# Patient Record
Sex: MALE | Race: WHITE | Employment: FULL TIME | URBAN - NONMETROPOLITAN AREA
[De-identification: names, ages, dates, MRNs, and addresses within clinical notes are randomized per-mention and may not be internally consistent; named-entity substitution may affect disease eponyms.]

---

## 2021-05-26 ENCOUNTER — HOSPITAL ENCOUNTER (EMERGENCY)
Age: 26
Discharge: HOME | End: 2021-05-26
Attending: EMERGENCY MEDICINE
Payer: COMMERCIAL

## 2021-05-26 ENCOUNTER — APPOINTMENT (OUTPATIENT)
Dept: GENERAL RADIOLOGY | Age: 26
End: 2021-05-26
Payer: COMMERCIAL

## 2021-05-26 VITALS
WEIGHT: 285 LBS | HEART RATE: 83 BPM | DIASTOLIC BLOOD PRESSURE: 47 MMHG | OXYGEN SATURATION: 96 % | BODY MASS INDEX: 38.6 KG/M2 | TEMPERATURE: 97.6 F | RESPIRATION RATE: 19 BRPM | SYSTOLIC BLOOD PRESSURE: 151 MMHG | HEIGHT: 72 IN

## 2021-05-26 DIAGNOSIS — K29.01 ACUTE GASTRITIS WITH HEMORRHAGE, UNSPECIFIED GASTRITIS TYPE: Primary | ICD-10-CM

## 2021-05-26 LAB
ABSOLUTE EOS #: 0.18 K/UL (ref 0–0.44)
ABSOLUTE IMMATURE GRANULOCYTE: 0.04 K/UL (ref 0–0.3)
ABSOLUTE LYMPH #: 2.71 K/UL (ref 1.1–3.7)
ABSOLUTE MONO #: 0.81 K/UL (ref 0.1–1.2)
ALBUMIN SERPL-MCNC: 4.6 G/DL (ref 3.5–5.2)
ALBUMIN/GLOBULIN RATIO: 1.5 (ref 1–2.5)
ALP BLD-CCNC: 66 U/L (ref 40–129)
ALT SERPL-CCNC: 57 U/L (ref 5–41)
ANION GAP SERPL CALCULATED.3IONS-SCNC: 10 MMOL/L (ref 9–17)
AST SERPL-CCNC: 32 U/L
BASOPHILS # BLD: 0 % (ref 0–2)
BASOPHILS ABSOLUTE: 0.04 K/UL (ref 0–0.2)
BILIRUB SERPL-MCNC: 0.26 MG/DL (ref 0.3–1.2)
BUN BLDV-MCNC: 22 MG/DL (ref 6–20)
BUN/CREAT BLD: 28 (ref 9–20)
CALCIUM SERPL-MCNC: 10.1 MG/DL (ref 8.6–10.4)
CHLORIDE BLD-SCNC: 97 MMOL/L (ref 98–107)
CO2: 26 MMOL/L (ref 20–31)
CREAT SERPL-MCNC: 0.78 MG/DL (ref 0.7–1.2)
DIFFERENTIAL TYPE: ABNORMAL
EOSINOPHILS RELATIVE PERCENT: 2 % (ref 1–4)
GFR AFRICAN AMERICAN: >60 ML/MIN
GFR NON-AFRICAN AMERICAN: >60 ML/MIN
GFR SERPL CREATININE-BSD FRML MDRD: ABNORMAL ML/MIN/{1.73_M2}
GFR SERPL CREATININE-BSD FRML MDRD: ABNORMAL ML/MIN/{1.73_M2}
GLUCOSE BLD-MCNC: 98 MG/DL (ref 70–99)
HCT VFR BLD CALC: 52.4 % (ref 40.7–50.3)
HEMOGLOBIN: 18.4 G/DL (ref 13–17)
IMMATURE GRANULOCYTES: 0 %
LIPASE: 42 U/L (ref 13–60)
LYMPHOCYTES # BLD: 27 % (ref 24–43)
MAGNESIUM: 2 MG/DL (ref 1.6–2.6)
MCH RBC QN AUTO: 29.9 PG (ref 25.2–33.5)
MCHC RBC AUTO-ENTMCNC: 35.1 G/DL (ref 28.4–34.8)
MCV RBC AUTO: 85.2 FL (ref 82.6–102.9)
MONOCYTES # BLD: 8 % (ref 3–12)
NRBC AUTOMATED: 0 PER 100 WBC
PDW BLD-RTO: 11.8 % (ref 11.8–14.4)
PLATELET # BLD: 250 K/UL (ref 138–453)
PLATELET ESTIMATE: ABNORMAL
PMV BLD AUTO: 9.2 FL (ref 8.1–13.5)
POTASSIUM SERPL-SCNC: 3.5 MMOL/L (ref 3.7–5.3)
RBC # BLD: 6.15 M/UL (ref 4.21–5.77)
RBC # BLD: ABNORMAL 10*6/UL
SEG NEUTROPHILS: 63 % (ref 36–65)
SEGMENTED NEUTROPHILS ABSOLUTE COUNT: 6.31 K/UL (ref 1.5–8.1)
SODIUM BLD-SCNC: 133 MMOL/L (ref 135–144)
TOTAL PROTEIN: 7.7 G/DL (ref 6.4–8.3)
WBC # BLD: 10.1 K/UL (ref 3.5–11.3)
WBC # BLD: ABNORMAL 10*3/UL

## 2021-05-26 PROCEDURE — 83690 ASSAY OF LIPASE: CPT

## 2021-05-26 PROCEDURE — 2580000003 HC RX 258: Performed by: EMERGENCY MEDICINE

## 2021-05-26 PROCEDURE — 83735 ASSAY OF MAGNESIUM: CPT

## 2021-05-26 PROCEDURE — 85025 COMPLETE CBC W/AUTO DIFF WBC: CPT

## 2021-05-26 PROCEDURE — 96365 THER/PROPH/DIAG IV INF INIT: CPT

## 2021-05-26 PROCEDURE — 36415 COLL VENOUS BLD VENIPUNCTURE: CPT

## 2021-05-26 PROCEDURE — 6360000002 HC RX W HCPCS: Performed by: EMERGENCY MEDICINE

## 2021-05-26 PROCEDURE — 71046 X-RAY EXAM CHEST 2 VIEWS: CPT

## 2021-05-26 PROCEDURE — 80053 COMPREHEN METABOLIC PANEL: CPT

## 2021-05-26 PROCEDURE — 6370000000 HC RX 637 (ALT 250 FOR IP): Performed by: EMERGENCY MEDICINE

## 2021-05-26 PROCEDURE — C9113 INJ PANTOPRAZOLE SODIUM, VIA: HCPCS | Performed by: EMERGENCY MEDICINE

## 2021-05-26 PROCEDURE — 99285 EMERGENCY DEPT VISIT HI MDM: CPT

## 2021-05-26 RX ORDER — ARIPIPRAZOLE 2 MG/1
2 TABLET ORAL DAILY
COMMUNITY

## 2021-05-26 RX ORDER — SUCRALFATE 1 G/1
1 TABLET ORAL 4 TIMES DAILY
Qty: 120 TABLET | Refills: 3 | Status: SHIPPED | OUTPATIENT
Start: 2021-05-26

## 2021-05-26 RX ORDER — LISINOPRIL 20 MG/1
20 TABLET ORAL DAILY
COMMUNITY

## 2021-05-26 RX ORDER — ONDANSETRON 4 MG/1
4 TABLET, ORALLY DISINTEGRATING ORAL ONCE
Status: DISCONTINUED | OUTPATIENT
Start: 2021-05-26 | End: 2021-05-27 | Stop reason: HOSPADM

## 2021-05-26 RX ORDER — SUCRALFATE 1 G/1
1 TABLET ORAL ONCE
Status: DISCONTINUED | OUTPATIENT
Start: 2021-05-26 | End: 2021-05-27 | Stop reason: HOSPADM

## 2021-05-26 RX ORDER — ONDANSETRON 4 MG/1
4 TABLET, ORALLY DISINTEGRATING ORAL EVERY 8 HOURS PRN
Qty: 20 TABLET | Refills: 0 | Status: SHIPPED | OUTPATIENT
Start: 2021-05-26

## 2021-05-26 RX ORDER — LAMOTRIGINE 25 MG/1
50 TABLET ORAL DAILY
COMMUNITY

## 2021-05-26 RX ORDER — ONDANSETRON 4 MG/1
4 TABLET, ORALLY DISINTEGRATING ORAL ONCE
Status: COMPLETED | OUTPATIENT
Start: 2021-05-26 | End: 2021-05-26

## 2021-05-26 RX ORDER — HYDROCHLOROTHIAZIDE 12.5 MG/1
12.5 TABLET ORAL DAILY
COMMUNITY

## 2021-05-26 RX ORDER — PANTOPRAZOLE SODIUM 40 MG/1
40 TABLET, DELAYED RELEASE ORAL DAILY
Qty: 30 TABLET | Refills: 3 | Status: SHIPPED | OUTPATIENT
Start: 2021-05-26

## 2021-05-26 RX ORDER — 0.9 % SODIUM CHLORIDE 0.9 %
1000 INTRAVENOUS SOLUTION INTRAVENOUS ONCE
Status: COMPLETED | OUTPATIENT
Start: 2021-05-26 | End: 2021-05-26

## 2021-05-26 RX ORDER — FAMOTIDINE 20 MG/1
20 TABLET, FILM COATED ORAL 2 TIMES DAILY
COMMUNITY

## 2021-05-26 RX ORDER — PANTOPRAZOLE SODIUM 40 MG/10ML
INJECTION, POWDER, LYOPHILIZED, FOR SOLUTION INTRAVENOUS
Status: DISCONTINUED
Start: 2021-05-26 | End: 2021-05-27 | Stop reason: HOSPADM

## 2021-05-26 RX ADMIN — SODIUM CHLORIDE 1000 ML: 9 INJECTION, SOLUTION INTRAVENOUS at 21:02

## 2021-05-26 RX ADMIN — PANTOPRAZOLE SODIUM 80 MG: 40 INJECTION, POWDER, FOR SOLUTION INTRAVENOUS at 21:03

## 2021-05-26 RX ADMIN — ONDANSETRON 4 MG: 4 TABLET, ORALLY DISINTEGRATING ORAL at 21:03

## 2021-05-26 ASSESSMENT — PAIN SCALES - GENERAL: PAINLEVEL_OUTOF10: 4

## 2021-05-26 ASSESSMENT — PAIN DESCRIPTION - PAIN TYPE: TYPE: ACUTE PAIN

## 2021-05-26 ASSESSMENT — PAIN DESCRIPTION - DESCRIPTORS: DESCRIPTORS: DISCOMFORT

## 2021-05-26 ASSESSMENT — PAIN DESCRIPTION - LOCATION: LOCATION: ABDOMEN

## 2021-05-27 NOTE — ED PROVIDER NOTES
[facility]  EMERGENCY DEPARTMENT ENCOUNTER      Pt Name: Pete Wang  MRN: [id number]  [place] 1995  Date of evaluation: 5/26/2021  Provider: Sera Barbosa MD    [address] [place] [address]       Chief Complaint   Patient presents with    Emesis     bloody emesis x 3 starting just prior to arrival         HISTORY OF PRESENT ILLNESS   (Location/Symptom, Timing/Onset, Context/Setting, Quality, Duration, Modifying Factors, Severity)  Note limiting factors. Pete Wang is a 31 y.o. male who presents to the emergency department      58-year-old male brought to the emergency department for evaluation of vomiting blood. Patient states that he had had some chicken salad this evening. About a half an hour after that he developed nausea and had an episode of bright red blood. He has had 2 emesis since then both of which have dark streaks. States a few years ago he had a similar episode though he vomited a significant amount of blood he became dizzy lightheaded and was noted to be anemic. 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 He did not require any surgery other than the endoscopy at that time. States he had been placed on Protonix but has been weaned off this. Currently only takes Pepcid usually in the morning and then in the evening if needed. Denies any significant alcohol use and has not drank any alcohol recently. Does have intermittent symptoms of heartburn but none today. No known sick contacts. No diarrhea. No constipation. Patient has some left upper quadrant tenderness but otherwise denies any chest pain any other abdominal pain or distention or any other acute concerns. Nursing Notes were reviewed. REVIEW OF SYSTEMS    (2-9 systems for level 4, 10 or more for level 5)     Review of Systems   All other systems reviewed and are negative.       Except as noted above the remainder of the review of systems was and Family:     Frequency of Social Gatherings with Friends and Family:     Attends [employer] Services:     Active Member of Clubs or Organizations:     Attends Club or Organization Meetings:     Marital Status:    Intimate Partner Violence:     Fear of Current or Ex-Partner:     Emotionally Abused:     Physically Abused:     Sexually Abused:        SCREENINGS        [place] Coma Scale  Eye Opening: Spontaneous  Best Verbal Response: Oriented  Best Motor Response: Obeys commands  Connie Coma Scale Score: 15               PHYSICAL EXAM    (up to 7 for level 4, 8 or more for level 5)     ED Triage Vitals [05/26/21 2015]   BP Temp Temp Source Pulse Resp SpO2 Height Weight   134/80 97.6 °F (36.4 °C) Temporal 113 18 95 % 6' (1.829 m) 285 lb (129.3 kg)       Physical Exam  Vitals and nursing note reviewed. Constitutional:       General: He is not in acute distress. Appearance: He is not toxic-appearing. HENT:      Head: Normocephalic and atraumatic. Cardiovascular:      Rate and Rhythm: Normal rate and regular rhythm. Pulmonary:      Effort: Pulmonary effort is normal.      Breath sounds: Normal breath sounds. Abdominal:      Comments: Abdomen is nondistended. Patient does have left upper quadrant tenderness on exam.  There is no rebound tenderness. No guarding. Musculoskeletal:         General: Normal range of motion. Skin:     General: Skin is warm and dry. Findings: No rash. Neurological:      General: No focal deficit present. Mental Status: He is alert and oriented to person, place, and time.          DIAGNOSTIC RESULTS     EKG: All EKG's are interpreted by the Emergency Department Physician who either signs or Co-signs this chart in the absence of a cardiologist.        RADIOLOGY:   Non-plain film images such as CT, Ultrasound and MRI are read by the radiologist. Plain radiographic images are visualized and preliminarily interpreted by the emergency physician with the below findings:        Interpretation per the Radiologist below, if available at the time of this note:    XR CHEST (2 VW)   Final Result   No acute cardiopulmonary disease. ED BEDSIDE ULTRASOUND:   Performed by ED Physician - none    LABS:  Labs Reviewed   CBC WITH AUTO DIFFERENTIAL - Abnormal; Notable for the following components:       Result Value    RBC 6.15 (*)     Hemoglobin 18.4 (*)     Hematocrit 52.4 (*)     MCHC 35.1 (*)     All other components within normal limits   COMPREHENSIVE METABOLIC PANEL W/ REFLEX TO MG FOR LOW K - Abnormal; Notable for the following components:    BUN 22 (*)     Bun/Cre Ratio 28 (*)     Sodium 133 (*)     Potassium 3.5 (*)     Chloride 97 (*)     ALT 57 (*)     Total Bilirubin 0.26 (*)     All other components within normal limits   LIPASE   MAGNESIUM       All other labs were within normal range or not returned as of this dictation. EMERGENCY DEPARTMENT COURSE and DIFFERENTIAL DIAGNOSIS/MDM:   Vitals:    Vitals:    05/26/21 2201 05/26/21 2215 05/26/21 2216 05/26/21 2230   BP:  (!) 140/59  (!) 151/47   Pulse:  90  83   Resp:  22  19   Temp:       TempSrc:       SpO2: 95%  96% 96%   Weight:       Height:               MDM  Number of Diagnoses or Management Options  Acute gastritis with hemorrhage, unspecified gastritis type  Diagnosis management comments: IV fluids Protonix and Zofran ordered. Patient states he is feeling significantly better. Heart rate and blood pressure remained stable. Chest x-ray unremarkable. Globin elevated at 18.4. Patient was discussed via telephone with Dr. Landen Muller. Per discussion patient is feeling significantly better and he does wish to try outpatient management. He remained hemodynamically stable had no recurrent episodes of emesis following Zofran administration. After discussion he will be discharged home will be started on Protonix daily he was also given a prescription for Zofran as well as Carafate.   Per discussion with  Shahrzad De La Paz he will follow-up in her office on Friday. Patient was instructed to make sure that he follows up with general surgery on Friday but that he seeks medical attention immediately for any recurrent symptoms worsening pain or any other acute concerns. He was agreeable to this and stable for discharge home. MIPS       REASSESSMENT          CRITICAL CARE TIME   Total Critical Care time was  minutes, excluding separately reportable procedures. There was a high probability of clinically significant/life threatening deterioration in the patient's condition which required my urgent intervention. CONSULTS:  None    PROCEDURES:  Unless otherwise noted below, none     Procedures      FINAL IMPRESSION      1. Acute gastritis with hemorrhage, unspecified gastritis type          DISPOSITION/PLAN   DISPOSITION Decision To Discharge 05/26/2021 10:24:12 PM      PATIENT REFERRED TO:  [address], [address] [place] [address]       Call tomorrow to be seen Friday`      DISCHARGE MEDICATIONS:  Discharge Medication List as of 5/26/2021 10:29 PM      START taking these medications    Details   ondansetron (ZOFRAN ODT) 4 MG disintegrating tablet Take 1 tablet by mouth every 8 hours as needed for Nausea or Vomiting, Disp-20 tablet, R-0Print      pantoprazole (PROTONIX) 40 MG tablet Take 1 tablet by mouth daily, Disp-30 tablet, R-3Print      sucralfate (CARAFATE) 1 GM tablet Take 1 tablet by mouth 4 times daily, Disp-120 tablet, R-3Print           Controlled Substances Monitoring:     No flowsheet data found.     (Please note that portions of this note were completed with a voice recognition program.  Efforts were made to edit the dictations but occasionally words are mis-transcribed.)    Mena Cervantes MD (electronically signed)  Attending Emergency Physician             Mena Cervantes MD  05/27/21 1217

## 2022-03-14 ENCOUNTER — HOSPITAL ENCOUNTER (EMERGENCY)
Age: 27
Discharge: HOME | End: 2022-03-14
Attending: EMERGENCY MEDICINE
Payer: COMMERCIAL

## 2022-03-14 ENCOUNTER — APPOINTMENT (OUTPATIENT)
Dept: CT IMAGING | Age: 27
End: 2022-03-14
Payer: COMMERCIAL

## 2022-03-14 ENCOUNTER — APPOINTMENT (OUTPATIENT)
Dept: GENERAL RADIOLOGY | Age: 27
End: 2022-03-14
Payer: COMMERCIAL

## 2022-03-14 VITALS
RESPIRATION RATE: 18 BRPM | DIASTOLIC BLOOD PRESSURE: 66 MMHG | HEART RATE: 104 BPM | OXYGEN SATURATION: 99 % | SYSTOLIC BLOOD PRESSURE: 151 MMHG | TEMPERATURE: 98 F

## 2022-03-14 DIAGNOSIS — M94.0 COSTOCHONDRITIS: Primary | ICD-10-CM

## 2022-03-14 DIAGNOSIS — R10.12 ABDOMINAL PAIN, LEFT UPPER QUADRANT: ICD-10-CM

## 2022-03-14 PROCEDURE — 96372 THER/PROPH/DIAG INJ SC/IM: CPT

## 2022-03-14 PROCEDURE — 99283 EMERGENCY DEPT VISIT LOW MDM: CPT

## 2022-03-14 PROCEDURE — 6370000000 HC RX 637 (ALT 250 FOR IP): Performed by: EMERGENCY MEDICINE

## 2022-03-14 PROCEDURE — 6360000002 HC RX W HCPCS: Performed by: EMERGENCY MEDICINE

## 2022-03-14 PROCEDURE — 71101 X-RAY EXAM UNILAT RIBS/CHEST: CPT

## 2022-03-14 PROCEDURE — 74160 CT ABDOMEN W/CONTRAST: CPT

## 2022-03-14 PROCEDURE — 6360000004 HC RX CONTRAST MEDICATION: Performed by: EMERGENCY MEDICINE

## 2022-03-14 RX ORDER — HYDROCODONE BITARTRATE AND ACETAMINOPHEN 5; 325 MG/1; MG/1
1 TABLET ORAL ONCE
Status: COMPLETED | OUTPATIENT
Start: 2022-03-14 | End: 2022-03-14

## 2022-03-14 RX ORDER — KETOROLAC TROMETHAMINE 30 MG/ML
60 INJECTION, SOLUTION INTRAMUSCULAR; INTRAVENOUS ONCE
Status: COMPLETED | OUTPATIENT
Start: 2022-03-14 | End: 2022-03-14

## 2022-03-14 RX ADMIN — HYDROCODONE BITARTRATE AND ACETAMINOPHEN 1 TABLET: 5; 325 TABLET ORAL at 19:43

## 2022-03-14 RX ADMIN — KETOROLAC TROMETHAMINE 60 MG: 30 INJECTION, SOLUTION INTRAMUSCULAR at 21:14

## 2022-03-14 RX ADMIN — IOPAMIDOL 75 ML: 755 INJECTION, SOLUTION INTRAVENOUS at 19:54

## 2022-03-14 ASSESSMENT — PAIN SCALES - GENERAL
PAINLEVEL_OUTOF10: 7

## 2022-03-14 ASSESSMENT — ENCOUNTER SYMPTOMS
ABDOMINAL PAIN: 1
NAUSEA: 0
SHORTNESS OF BREATH: 0
SORE THROAT: 0
TROUBLE SWALLOWING: 0
VOMITING: 0

## 2022-03-14 ASSESSMENT — PAIN DESCRIPTION - DESCRIPTORS: DESCRIPTORS: SHARP

## 2022-03-14 ASSESSMENT — PAIN DESCRIPTION - ORIENTATION: ORIENTATION: LEFT

## 2022-03-14 ASSESSMENT — PAIN DESCRIPTION - PAIN TYPE: TYPE: ACUTE PAIN

## 2022-03-14 ASSESSMENT — PAIN DESCRIPTION - LOCATION: LOCATION: ABDOMEN

## 2022-03-14 NOTE — LETTER
[facility] ED  [address] [place] [address] [place] [address]  Phone: [phone]  Fax: [phone]               March 14, 2022    Patient: Gissel Jerome   YOB: 1995   Date of Visit: 3/14/2022       To Whom It May Concern:    Gissel Jerome was seen and treated in our emergency department on 3/14/2022. He should be on light duty until 3/20/2022.       Sincerely,       Dolly Glover RN         Signature:__________________________________

## 2024-12-09 ENCOUNTER — TRANSCRIBE ORDERS (OUTPATIENT)
Dept: ADMINISTRATIVE | Age: 29
End: 2024-12-09

## 2024-12-09 DIAGNOSIS — Z02.1 PRE-EMPLOYMENT HEALTH SCREENING EXAMINATION: Primary | ICD-10-CM

## 2024-12-11 ENCOUNTER — HOSPITAL ENCOUNTER (OUTPATIENT)
Age: 29
Discharge: HOME | End: 2024-12-11

## 2024-12-11 ENCOUNTER — HOSPITAL ENCOUNTER (OUTPATIENT)
Age: 29
Discharge: HOME | End: 2024-12-13

## 2024-12-11 DIAGNOSIS — Z02.1 PRE-EMPLOYMENT HEALTH SCREENING EXAMINATION: ICD-10-CM

## 2024-12-11 LAB
ALBUMIN SERPL-MCNC: 4.6 G/DL (ref 3.5–5.2)
ALBUMIN/GLOB SERPL: 1.7 {RATIO} (ref 1–2.5)
ALP SERPL-CCNC: 64 U/L (ref 40–129)
ALT SERPL-CCNC: 39 U/L (ref 10–50)
ANION GAP SERPL CALCULATED.3IONS-SCNC: 22 MMOL/L (ref 9–16)
AST SERPL-CCNC: 34 U/L (ref 10–50)
BILIRUB SERPL-MCNC: 0.5 MG/DL (ref 0–1.2)
BUN SERPL-MCNC: 9 MG/DL (ref 6–20)
BUN/CREAT SERPL: 8 (ref 9–20)
CALCIUM SERPL-MCNC: 9.6 MG/DL (ref 8.6–10.4)
CHLORIDE SERPL-SCNC: 101 MMOL/L (ref 98–107)
CHOLEST SERPL-MCNC: 128 MG/DL (ref 0–199)
CHOLESTEROL/HDL RATIO: 3.5
CO2 SERPL-SCNC: 16 MMOL/L (ref 20–31)
CREAT SERPL-MCNC: 1.2 MG/DL (ref 0.7–1.2)
ERYTHROCYTE [DISTWIDTH] IN BLOOD BY AUTOMATED COUNT: 12.2 % (ref 11.8–14.4)
GFR, ESTIMATED: 82 ML/MIN/1.73M2
GLUCOSE SERPL-MCNC: 95 MG/DL (ref 74–99)
HCT VFR BLD AUTO: 59 % (ref 40.7–50.3)
HDLC SERPL-MCNC: 37 MG/DL
HGB BLD-MCNC: 20.3 G/DL (ref 13–17)
LDLC SERPL CALC-MCNC: 75 MG/DL (ref 0–100)
MCH RBC QN AUTO: 30.5 PG (ref 25.2–33.5)
MCHC RBC AUTO-ENTMCNC: 34.4 G/DL (ref 28.4–34.8)
MCV RBC AUTO: 88.6 FL (ref 82.6–102.9)
NRBC BLD-RTO: 0 PER 100 WBC
PLATELET # BLD AUTO: 264 K/UL (ref 138–453)
PMV BLD AUTO: 9.6 FL (ref 8.1–13.5)
POTASSIUM SERPL-SCNC: 4.2 MMOL/L (ref 3.7–5.3)
PROT SERPL-MCNC: 7.3 G/DL (ref 6.6–8.7)
PSA SERPL-MCNC: 0.6 NG/ML (ref 0–4)
RBC # BLD AUTO: 6.66 M/UL (ref 4.21–5.77)
SODIUM SERPL-SCNC: 139 MMOL/L (ref 136–145)
STRESS BASELINE DIAS BP: 70 MMHG
STRESS BASELINE HR: 85 BPM
STRESS BASELINE SYS BP: 122 MMHG
STRESS ESTIMATED WORKLOAD: 14.1 METS
STRESS EXERCISE DUR MIN: 12 MIN
STRESS EXERCISE DUR SEC: 23 SEC
STRESS PEAK DIAS BP: 88 MMHG
STRESS PEAK SYS BP: 172 MMHG
STRESS PERCENT HR ACHIEVED: 98 %
STRESS POST PEAK HR: 187 BPM
STRESS RATE PRESSURE PRODUCT: NORMAL BPM*MMHG
STRESS ST DEPRESSION: 0.8 MM
STRESS TARGET HR: 191 BPM
TRIGL SERPL-MCNC: 78 MG/DL
VLDLC SERPL CALC-MCNC: 16 MG/DL (ref 1–30)
WBC OTHER # BLD: 8.4 K/UL (ref 3.5–11.3)

## 2024-12-11 PROCEDURE — 36415 COLL VENOUS BLD VENIPUNCTURE: CPT

## 2024-12-11 PROCEDURE — 93018 CV STRESS TEST I&R ONLY: CPT | Performed by: FAMILY MEDICINE

## 2024-12-11 PROCEDURE — 80053 COMPREHEN METABOLIC PANEL: CPT

## 2024-12-11 PROCEDURE — 85027 COMPLETE CBC AUTOMATED: CPT

## 2024-12-11 PROCEDURE — 93005 ELECTROCARDIOGRAM TRACING: CPT | Performed by: NURSE PRACTITIONER

## 2024-12-11 PROCEDURE — G0103 PSA SCREENING: HCPCS

## 2024-12-11 PROCEDURE — 93017 CV STRESS TEST TRACING ONLY: CPT

## 2024-12-11 PROCEDURE — 80061 LIPID PANEL: CPT

## 2024-12-11 PROCEDURE — 93016 CV STRESS TEST SUPVJ ONLY: CPT | Performed by: FAMILY MEDICINE

## 2024-12-11 PROCEDURE — 86480 TB TEST CELL IMMUN MEASURE: CPT

## 2024-12-12 LAB
EKG ATRIAL RATE: 72 BPM
EKG P AXIS: 34 DEGREES
EKG P-R INTERVAL: 122 MS
EKG Q-T INTERVAL: 384 MS
EKG QRS DURATION: 106 MS
EKG QTC CALCULATION (BAZETT): 420 MS
EKG R AXIS: 77 DEGREES
EKG T AXIS: 11 DEGREES
EKG VENTRICULAR RATE: 72 BPM

## 2024-12-12 PROCEDURE — 93010 ELECTROCARDIOGRAM REPORT: CPT | Performed by: INTERNAL MEDICINE

## 2024-12-13 ENCOUNTER — HOSPITAL ENCOUNTER (OUTPATIENT)
Dept: GENERAL RADIOLOGY | Age: 29
Discharge: HOME | End: 2024-12-15

## 2024-12-13 ENCOUNTER — HOSPITAL ENCOUNTER (OUTPATIENT)
Age: 29
Discharge: HOME | End: 2024-12-15

## 2024-12-13 DIAGNOSIS — Z02.1 PRE-EMPLOYMENT HEALTH SCREENING EXAMINATION: ICD-10-CM

## 2024-12-13 PROCEDURE — 71046 X-RAY EXAM CHEST 2 VIEWS: CPT

## 2024-12-14 LAB
QUANTI TB GOLD PLUS: NEGATIVE
QUANTI TB1 MINUS NIL: 0.02 IU/ML
QUANTI TB2 MINUS NIL: 0 IU/ML
QUANTIFERON MITOGEN: 9.86 IU/ML
QUANTIFERON NIL: 0.14 IU/ML